# Patient Record
Sex: MALE | Race: BLACK OR AFRICAN AMERICAN | NOT HISPANIC OR LATINO | Employment: OTHER | URBAN - METROPOLITAN AREA
[De-identification: names, ages, dates, MRNs, and addresses within clinical notes are randomized per-mention and may not be internally consistent; named-entity substitution may affect disease eponyms.]

---

## 2019-08-16 ENCOUNTER — TELEPHONE (OUTPATIENT)
Dept: PHARMACY | Facility: CLINIC | Age: 64
End: 2019-08-16

## 2019-08-16 NOTE — TELEPHONE ENCOUNTER
LVM for callback to inform patient that [facility] received prescription for Epclusa and prior authorization is required.  [facility] will be back in touch once insurance determination is received.

## 2019-08-27 NOTE — TELEPHONE ENCOUNTER
DOCUMENTATION ONLY:  Prior authorization for AG Epclusa approved from 8/27/2019 to 11/19/2019 x 12 weeks of treatment.   Case ID# None    Co-pay: $1.09    Patient Assistance IS NOT required.     Forward to clinical pharmacist for consult & shipment.

## 2019-09-03 ENCOUNTER — TELEPHONE (OUTPATIENT)
Dept: PHARMACY | Facility: CLINIC | Age: 64
End: 2019-09-03

## 2019-09-03 NOTE — TELEPHONE ENCOUNTER
Patient will be dispensed authorized generic of Epclusa.  All references to Epclusa will be for the authorized generic.    Initial Epclusa consult completed on 9/3. Epclusa will be shipped on  to arrive at patient's home on  via [employer]. $0.00 copay. Patient will start Epclusa on . Address confirmed, CC on file. Confirmed 2 patient identifiers - name and . Therapy Appropriate.     Epclusa 400/100mg- Take one tablet by mouth daily x 12 weeks  Counseling was reviewed:   1. Patient MUST take Epclusa at the SAME time every day.   2. Patient MUST avoid acid reducers without consulting with myself or provider first. Antacids are to be spaced out at least 4 hours apart from Epclusa.  H2 blocker is to be taken AT THE SAME TIME as Epclusa.      3. Potential Side effects include: nausea, headaches, insomnia and fatigue.   Headache: Patient may treat with OTC remedies. If Tylenol is used, dose should not exceed 2000mg per day.    4. Medication list reviewed. No DDIs or allergies noted. Patient MUST contact myself or provider prior to starting any new OTC, herbal, or prescription drugs to avoid potential DDIs.    DDI: Medication list reviewed and potential DDIs addressed.  Patient informed to hold Omeprazole; patient will try H2 blocker to be dosed at the same time as Epclusa.     Discussed the importance of staying well hydrated while on therapy. Compliance stressed - patient to take missed doses as soon as remembered, but NOT to take 2 doses in one day. Patient will report questions or concerns to myself or practitioner. Patient verbalizes understanding. Patient plans to start Epclusa on .  Consultation included: indication; goals of treatment; administration; storage and handling; side effects; how to handle side effects; the importance of compliance; how to handle missed doses; the importance of laboratory monitoring; the importance of keeping all follow up appointments.  Patient understands to report any  medication changes to [facility] and provider. All questions answered and addressed to patients satisfaction. I will f/u with her in 1 week from start, [facility] to contact patient in 3 weeks for refills.

## 2019-09-13 ENCOUNTER — TELEPHONE (OUTPATIENT)
Dept: PHARMACY | Facility: CLINIC | Age: 64
End: 2019-09-13

## 2019-09-13 NOTE — TELEPHONE ENCOUNTER
Epclusa 7 Day Touchbase attempted. No answer. Left voicemail for call back. Will follow-up at refill.

## 2019-09-16 NOTE — TELEPHONE ENCOUNTER
Patient called back for [facility] 7 day touch base. Confirmed he has been taking the medication everyday at 7 pm with no missed doses. Mr. Burt started on . Patient denies any side effects experienced to this point. He is very happy to be on treatment and hopeful he will be cured at the end. No questions or concerns. Name and  confirmed. Aware [facility] will f/u for medication refills.

## 2019-09-26 ENCOUNTER — TELEPHONE (OUTPATIENT)
Dept: PHARMACY | Facility: CLINIC | Age: 64
End: 2019-09-26

## 2019-09-26 NOTE — TELEPHONE ENCOUNTER
AG Epclusa refill and f/u attempted. Mr. Pagan was not home and will obtain on hand count and call back [facility]. Will f/u.

## 2019-09-30 NOTE — TELEPHONE ENCOUNTER
Epclusa refill (2 of 3) confirmed and follow-up completed. We will ship Epclusa refill on  via [employer] to arrive on 10/1. $0 copay- 004. Confirmed 2 patient identifiers - name and .     Patient has 4 doses of Epclusa remaining and takes it at the same time daily.  Pt reports they are not having any side effects so far. No missed doses, no new medications, no new allergies or health conditions reported at this time. All questions answered and addressed to patients satisfaction. Pt verbalized understanding.       Informed Lyris (mother) that test was negative  Mom had some questions about starting pill and not having period  Told would call back after speaking with Dr Cm Jade  Spoke with Dr Cm Jade  Patient may start taking birth control this Sunday  Also to just monitor cycles for now  And to keep follow up as scheduled;ed for 3 months  If has any more concerns to please call back

## 2019-10-28 ENCOUNTER — TELEPHONE (OUTPATIENT)
Dept: PHARMACY | Facility: CLINIC | Age: 64
End: 2019-10-28

## 2019-10-28 NOTE — TELEPHONE ENCOUNTER
Refill readiness for Epclusa confirmed with patient; name/ confirmed; no missed doses; no new medications; no side effects noted; address confirmed for 10/29 shipment and 10/30 delivery.  Patient states they have 6 doses remaining.     HELENA Witt.Ph., AAHIVP  Clinical Pharmacist, HIV/HCV  [facility]  Phone: [phone]